# Patient Record
Sex: FEMALE | Race: BLACK OR AFRICAN AMERICAN | NOT HISPANIC OR LATINO | Employment: PART TIME | URBAN - METROPOLITAN AREA
[De-identification: names, ages, dates, MRNs, and addresses within clinical notes are randomized per-mention and may not be internally consistent; named-entity substitution may affect disease eponyms.]

---

## 2017-02-24 ENCOUNTER — OFFICE VISIT (OUTPATIENT)
Dept: CARDIOLOGY | Facility: CLINIC | Age: 47
End: 2017-02-24

## 2017-02-24 VITALS
HEIGHT: 68 IN | WEIGHT: 254 LBS | DIASTOLIC BLOOD PRESSURE: 80 MMHG | BODY MASS INDEX: 38.49 KG/M2 | SYSTOLIC BLOOD PRESSURE: 116 MMHG | OXYGEN SATURATION: 100 % | HEART RATE: 56 BPM

## 2017-02-24 DIAGNOSIS — I25.42 CORONARY ARTERY DISSECTION: Primary | ICD-10-CM

## 2017-02-24 DIAGNOSIS — R63.5 WEIGHT GAIN: ICD-10-CM

## 2017-02-24 DIAGNOSIS — I10 ESSENTIAL HYPERTENSION: ICD-10-CM

## 2017-02-24 PROBLEM — G56.00 CARPAL TUNNEL SYNDROME: Status: ACTIVE | Noted: 2017-02-24

## 2017-02-24 PROCEDURE — 93000 ELECTROCARDIOGRAM COMPLETE: CPT | Performed by: PHYSICIAN ASSISTANT

## 2017-02-24 PROCEDURE — 99213 OFFICE O/P EST LOW 20 MIN: CPT | Performed by: PHYSICIAN ASSISTANT

## 2017-02-24 NOTE — PROGRESS NOTES
Date of Office Visit: 2017  Encounter Provider: TJ Mreaz  Place of Service: [facility] CARDIOLOGY  Patient Name: Myesha Matthew  :1970    Chief Complaint   Patient presents with   • Leg Swelling     4 month follow up   :     HPI: Myesha Matthew is a 46 y.o. female , new to me, who presents today for follow-up.  Old records a been obtained and reviewed by me.  She is a patient of Dr. Cohn's who I first saw in 2016.  At the end of 2016 she had a non-STEMI.  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  The rest of her coronary arteries were normal.  When I saw her on 2016, she was doing well.  She had quit smoking cigarettes and her blood pressure was well controlled.  After her spontaneous coronary dissection, she was placed on aspirin, Plavix, Lipitor, and Lopressor.  She was doing well on all of these medications without complaints.  She then followed up with Dr. Cohn on 10/11/2016 and was still doing well at that visit.  She did complain of a little shortness of breath when she saw Dr. Cohn on 10/11/2016.  He felt that this was probably noncardiac and secondary to either pulmonary issues and/or her weight.  He encouraged her to try to lose some weight and continue with cardiac rehabilitation.   She is here today because she has gained a significant amount of weight over the past 6 months.  She is also noticed some swelling in her legs.  She is a little concerned about it and thought it might be her heart.  She denies any chest pain.  She actually thinks that her shortness of breath that she had in 2016 is improved.  She works 2 jobs, so she really doesn't have much of a chance to exercise.  She does not eat a very healthy diet either.  She says that she is working on trying to find a new career path so that she can take more time to care for herself.  She denies any  palpitations, dizziness, chest pain, or syncope.      Past Medical History   Diagnosis Date   • CAD (coronary artery disease)    • Carpal tunnel syndrome    • GERD (gastroesophageal reflux disease)    • NSTEMI (non-ST elevated myocardial infarction)        Past Surgical History   Procedure Laterality Date   • Cardiac catheterization Left 9/1/2016     Procedure: Cardiac catheterization;  Surgeon: Nicholas Cohn MD;  Location: [facility] INVASIVE LOCATION;  Service:        Social History     Social History   • Marital status: Single     Spouse name: N/A   • Number of children: N/A   • Years of education: N/A     Occupational History   • Not on file.     Social History Main Topics   • Smoking status: Former Smoker   • Smokeless tobacco: Not on file   • Alcohol use 1.2 oz/week     2 Shots of liquor per week      Comment: weekends   • Drug use: No   • Sexual activity: Defer     Other Topics Concern   • Not on file     Social History Narrative       Family History   Problem Relation Age of Onset   • Diabetes Mother    • Hypertension Mother    • Heart failure Mother    • Breast cancer Mother    • Hepatitis Father    • No Known Problems Sister    • Crohn's disease Brother    • Hypertension Maternal Grandmother    • COPD Maternal Grandmother    • No Known Problems Maternal Grandfather    • Heart disease Paternal Grandmother    • Heart attack Paternal Grandmother    • COPD Paternal Grandmother    • No Known Problems Paternal Grandfather        Review of Systems   Constitution: Positive for weight gain. Negative for chills, fever, malaise/fatigue and weight loss.   HENT: Negative for ear pain, headaches, hearing loss, nosebleeds and sore throat.    Eyes: Negative for double vision, pain and visual disturbance.   Cardiovascular: Positive for leg swelling. Negative for chest pain, dyspnea on exertion, irregular heartbeat, near-syncope, orthopnea, palpitations, paroxysmal nocturnal dyspnea and syncope.   Respiratory: Negative  "for cough, shortness of breath, sleep disturbances due to breathing, snoring and wheezing.    Endocrine: Negative for cold intolerance, heat intolerance and polyuria.   Skin: Negative for itching and rash.   Musculoskeletal: Negative for joint pain, joint swelling and myalgias.   Gastrointestinal: Negative for abdominal pain, diarrhea, melena, nausea and vomiting.   Genitourinary: Negative for frequency, hematuria and hesitancy.   Neurological: Negative for excessive daytime sleepiness, light-headedness, numbness, paresthesias and seizures.   Psychiatric/Behavioral: Negative for altered mental status and depression.   Allergic/Immunologic: Negative.    All other systems reviewed and are negative.      No Known Allergies      Current Outpatient Prescriptions:   •  aspirin 81 MG chewable tablet, Chew 1 tablet daily., Disp: , Rfl:   •  atorvastatin (LIPITOR) 80 MG tablet, Take 1 tablet by mouth every night., Disp: 30 tablet, Rfl: 5  •  clopidogrel (PLAVIX) 75 MG tablet, Take 1 tablet by mouth daily., Disp: 30 tablet, Rfl: 5  •  metoprolol tartrate (LOPRESSOR) 25 MG tablet, Take 0.5 tablets by mouth every 12 (twelve) hours., Disp: 30 tablet, Rfl: 5     Objective:     Vitals:    02/24/17 0943 02/24/17 0949   BP: 116/78 116/80   BP Location: Right arm Left arm   Pulse: 56    SpO2: 98% 100%   Weight: 254 lb (115 kg)    Height: 68\" (172.7 cm)      Body mass index is 38.62 kg/(m^2).    PHYSICAL EXAM:    Physical Exam   Constitutional: She is oriented to person, place, and time. She appears well-developed and well-nourished. No distress.   HENT:   Head: Normocephalic and atraumatic.   Eyes: Pupils are equal, round, and reactive to light.   Neck: No JVD present. No thyromegaly present.   Cardiovascular: Normal rate, regular rhythm, normal heart sounds and intact distal pulses.    No murmur heard.  Pulmonary/Chest: Effort normal and breath sounds normal. No respiratory distress.   Abdominal: Soft. Bowel sounds are normal. She " exhibits no distension. There is no splenomegaly or hepatomegaly. There is no tenderness.   Musculoskeletal: Normal range of motion. She exhibits edema.   Very mild, less than 1+ bilateral lower extremity edema.   Neurological: She is alert and oriented to person, place, and time.   Skin: Skin is warm and dry. She is not diaphoretic. No erythema.   Psychiatric: She has a normal mood and affect. Her behavior is normal. Judgment normal.         ECG 12 Lead  Date/Time: 2/24/2017 10:03 AM  Performed by: ELLI PERALES.  Authorized by: ELLI PERALES   Comparison: compared with previous ECG from 10/11/2016  Similar to previous ECG  Rhythm: sinus rhythm  BPM: 56  T depression: III, aVF, V3, V4, V5 and V6  Clinical impression: abnormal ECG  Comments: Indication: History of coronary dissection, subsequent non-STEMI              Assessment:       Diagnosis Plan   1. Coronary artery dissection  ECG 12 Lead   2. Essential hypertension     3. Weight gain       Orders Placed This Encounter   Procedures   • ECG 12 Lead     This order was created via procedure documentation          Plan:       1.  Non-STEMI secondary to coronary artery dissection.  Overall she is doing great.  She quit smoking and is no longer using the nicotine patch.  Her blood pressure is well-controlled, and she is not having any more chest pain.  Continue current medical regimen.    2.  Hypertension.  Her blood pressure is great today, continue current plan.    3.  Weight gain and complaints of leg swelling.  I really do not think that this is cardiac in etiology.  Her shortness of breath is actually improved.  The swelling in her legs is extremely mild.  I'm not really sure how to explain the weight gain, however I've asked her to follow up with her primary care physician.  I did offer her an echocardiogram to assess her LV function, however I really don't think this is necessary and she would like to hold off on this.  We did talk about regular exercise  and a heart healthy diet, she indicated that she understood.  I do think that a really great way to lose weight quickly is to cut out carbohydrates.  I did instruct her on how to do this.    She will follow-up with Dr. Cohn in 6 months or sooner if needed.    As always, it has been a pleasure to participate in your patient's care.      Sincerely,         Maryann Giraldo PA-C

## 2017-03-06 RX ORDER — ATORVASTATIN CALCIUM 80 MG/1
TABLET, FILM COATED ORAL
Qty: 30 TABLET | Refills: 0 | Status: SHIPPED | OUTPATIENT
Start: 2017-03-06 | End: 2017-04-02 | Stop reason: SDUPTHER

## 2017-03-06 RX ORDER — CLOPIDOGREL BISULFATE 75 MG/1
TABLET ORAL
Qty: 30 TABLET | Refills: 0 | Status: SHIPPED | OUTPATIENT
Start: 2017-03-06 | End: 2017-04-02 | Stop reason: SDUPTHER

## 2017-04-03 RX ORDER — ATORVASTATIN CALCIUM 80 MG/1
TABLET, FILM COATED ORAL
Qty: 30 TABLET | Refills: 6 | Status: SHIPPED | OUTPATIENT
Start: 2017-04-03 | End: 2018-04-12 | Stop reason: SDUPTHER

## 2017-04-03 RX ORDER — CLOPIDOGREL BISULFATE 75 MG/1
TABLET ORAL
Qty: 30 TABLET | Refills: 6 | Status: SHIPPED | OUTPATIENT
Start: 2017-04-03 | End: 2017-12-03 | Stop reason: SDUPTHER

## 2017-04-26 ENCOUNTER — HOSPITAL ENCOUNTER (EMERGENCY)
Dept: HOSPITAL 23 - SED | Age: 47
Discharge: HOME | End: 2017-04-26
Payer: COMMERCIAL

## 2017-04-26 DIAGNOSIS — F17.200: ICD-10-CM

## 2017-04-26 DIAGNOSIS — Z90.710: ICD-10-CM

## 2017-04-26 DIAGNOSIS — R10.13: Primary | ICD-10-CM

## 2017-04-26 LAB
BARBITURATES UR QL SCN: 0.4 MG/DL (ref 0.2–2)
BARBITURATES UR QL SCN: 3.8 G/DL (ref 3.5–5)
BASOPHIL#: 0.1 X10E3 (ref 0–0.3)
BASOPHIL%: 0.8 % (ref 0–2.5)
BENZODIAZ UR QL SCN: 21 U/L (ref 10–42)
BENZODIAZ UR QL SCN: 23 U/L (ref 10–40)
BLOOD UREA NITROGEN: 17 MG/DL (ref 9–23)
BUN/CREATININE RATIO: 24.28
BZE UR QL SCN: 47 U/L (ref 32–92)
CALCIUM SERUM: 8.9 MG/DL (ref 8.4–10.2)
CK MB SERPL-RTO: 13.2 % (ref 11–15.5)
CK MB SERPL-RTO: 33.3 G/DL (ref 30–36)
CREATININE SERUM: 0.7 MG/DL (ref 0.6–1.4)
DIFF IND: NO
EOSINOPHIL#: 0.1 X10E3 (ref 0–0.7)
EOSINOPHIL%: 0.8 % (ref 0–7)
GLOM FILT RATE ESTIMATED: 120.4 ML/MIN (ref 60–?)
GLUCOSE FASTING: 100 MG/DL (ref 70–110)
HEMATOCRIT: 38.1 % (ref 35–45)
HEMOGLOBIN: 12.7 GM/DL (ref 12–16)
HIV1+2 AB SPEC QL IA.RAPID: 26.2 SECONDS (ref 25.6–38.1)
INR: 1
KETONES UR QL: 103 MMOL/L (ref 100–111)
KETONES UR QL: 25 MMOL/L (ref 22–31)
LIPASE: 24 U/L (ref 22–51)
LYMPHOCYTE#: 2 X10E3 (ref 1–3.5)
LYMPHOCYTE%: 16.1 % (ref 17–45)
MEAN CELL VOLUME: 90.6 FL (ref 83–96)
MEAN CORPUSCULAR HEMOGLOBIN: 30.2 PG (ref 28–34)
MEAN PLATELET VOLUME: 7.4 FL (ref 6.5–11.5)
METHADONE UR QL SCN: <1 NG/ML (ref 0–7.9)
MONOCYTE#: 0.8 X10E3 (ref 0–1)
MONOCYTE%: 6.8 % (ref 3–12)
NEUTROPHIL#: 9.5 X10E3 (ref 1.5–7.1)
NEUTROPHIL%: 75.5 % (ref 40–75)
PLATELET COUNT: 362 X10E3 (ref 140–420)
POC - TROPONIN: <0.05 NG/ML (ref ?–0.05)
POTASSIUM: 3.9 MMOL/L (ref 3.5–5.1)
PROTEIN TOTAL SERUM: 7.2 G/DL (ref 6–8.3)
PROTHROMBIN TIME (PATIENT): 11.6 SECONDS (ref 9.5–12.4)
RED BLOOD COUNT: 4.21 X10E (ref 3.9–5.3)
SODIUM: 133 MMOL/L (ref 135–145)
WHITE BLOOD COUNT: 12.5 X10E3 (ref 4–10.5)

## 2017-09-08 ENCOUNTER — OFFICE VISIT (OUTPATIENT)
Dept: CARDIOLOGY | Facility: CLINIC | Age: 47
End: 2017-09-08

## 2017-09-08 VITALS
WEIGHT: 254 LBS | HEART RATE: 76 BPM | BODY MASS INDEX: 38.49 KG/M2 | HEIGHT: 68 IN | SYSTOLIC BLOOD PRESSURE: 132 MMHG | DIASTOLIC BLOOD PRESSURE: 74 MMHG

## 2017-09-08 DIAGNOSIS — I21.4 NON-STEMI (NON-ST ELEVATED MYOCARDIAL INFARCTION) (HCC): Primary | ICD-10-CM

## 2017-09-08 DIAGNOSIS — E66.09 NON MORBID OBESITY DUE TO EXCESS CALORIES: ICD-10-CM

## 2017-09-08 DIAGNOSIS — I25.42 CORONARY ARTERY DISSECTION: ICD-10-CM

## 2017-09-08 PROCEDURE — 93000 ELECTROCARDIOGRAM COMPLETE: CPT | Performed by: INTERNAL MEDICINE

## 2017-09-08 PROCEDURE — 99214 OFFICE O/P EST MOD 30 MIN: CPT | Performed by: INTERNAL MEDICINE

## 2017-09-08 NOTE — PROGRESS NOTES
Date of Office Visit: 2017  Encounter Provider: Nicholas Cohn MD  Place of Service: [facility] CARDIOLOGY  Patient Name: Myesha Matthew  :1970  [id number]    Chief Complaint   Patient presents with   • Coronary Artery Disease     6 MONTH F/U   • NSTEMI   :     HPI: Myesha Matthew is a 46 y.o. female  she is a lady who had this I think a spontaneous coronary artery dissection to her distal LAD about a year ago since that time she has stop smoking or other coronaries were normal her LV function was normal overall she's felt well she's not had chest pain shortness of breath PND orthopnea and edema she's under number of stressors work-wise trying to make her bills try to make ends meet it's a little bit teary-eyed in the office    Past Medical History:   Diagnosis Date   • CAD (coronary artery disease)    • Carpal tunnel syndrome    • GERD (gastroesophageal reflux disease)    • NSTEMI (non-ST elevated myocardial infarction)        Past Surgical History:   Procedure Laterality Date   • CARDIAC CATHETERIZATION Left 2016    Procedure: Cardiac catheterization;  Surgeon: Nicholas Cohn MD;  Location: [facility] INVASIVE LOCATION;  Service:        Social History     Social History   • Marital status: Single     Spouse name: N/A   • Number of children: N/A   • Years of education: N/A     Occupational History   • Not on file.     Social History Main Topics   • Smoking status: Former Smoker   • Smokeless tobacco: Not on file   • Alcohol use 1.2 oz/week     2 Shots of liquor per week      Comment: weekends   • Drug use: No   • Sexual activity: Defer     Other Topics Concern   • Not on file     Social History Narrative       Family History   Problem Relation Age of Onset   • Diabetes Mother    • Hypertension Mother    • Heart failure Mother    • Breast cancer Mother    • Hepatitis Father    • No Known Problems Sister    • Crohn's disease Brother    • Hypertension Maternal Grandmother  "   • COPD Maternal Grandmother    • No Known Problems Maternal Grandfather    • Heart disease Paternal Grandmother    • Heart attack Paternal Grandmother    • COPD Paternal Grandmother    • No Known Problems Paternal Grandfather        Review of Systems   Constitution: Negative for decreased appetite, fever, malaise/fatigue and weight loss.   HENT: Negative for nosebleeds.    Eyes: Negative for double vision.   Cardiovascular: Negative for chest pain, claudication, cyanosis, dyspnea on exertion, irregular heartbeat, leg swelling, near-syncope, orthopnea, palpitations, paroxysmal nocturnal dyspnea and syncope.   Respiratory: Negative for cough, hemoptysis and shortness of breath.    Hematologic/Lymphatic: Negative for bleeding problem.   Skin: Negative for rash.   Musculoskeletal: Negative for falls and myalgias.   Gastrointestinal: Negative for hematochezia, jaundice, melena, nausea and vomiting.   Genitourinary: Negative for hematuria.   Neurological: Negative for dizziness and seizures.   Psychiatric/Behavioral: Negative for altered mental status and memory loss.       No Known Allergies      Current Outpatient Prescriptions:   •  aspirin 81 MG chewable tablet, Chew 1 tablet daily., Disp: , Rfl:   •  atorvastatin (LIPITOR) 80 MG tablet, TAKE 1 TABLET BY MOUTH EVERY NIGHT, Disp: 30 tablet, Rfl: 6  •  clopidogrel (PLAVIX) 75 MG tablet, TAKE 1 TABLET BY MOUTH DAILY, Disp: 30 tablet, Rfl: 6  •  metoprolol tartrate (LOPRESSOR) 25 MG tablet, TAKE 1/2 TABLET BY MOUTH EVERY 12 HOURS, Disp: 30 tablet, Rfl: 0     Objective:     Vitals:    09/08/17 1003   BP: 132/74   Pulse: 76   Weight: 254 lb (115 kg)   Height: 68\" (172.7 cm)     Body mass index is 38.62 kg/(m^2).    Physical Exam   Constitutional: She is oriented to person, place, and time. She appears well-developed and well-nourished.   HENT:   Head: Normocephalic.   Eyes: No scleral icterus.   Neck: No JVD present. No thyromegaly present.   Cardiovascular: Normal rate, " regular rhythm and normal heart sounds.  Exam reveals no gallop and no friction rub.    No murmur heard.  Pulmonary/Chest: Effort normal and breath sounds normal. She has no wheezes. She has no rales.   Abdominal: Soft. There is no hepatosplenomegaly. There is no tenderness.   Musculoskeletal: Normal range of motion. She exhibits no edema.   Lymphadenopathy:     She has no cervical adenopathy.   Neurological: She is alert and oriented to person, place, and time.   Skin: Skin is warm and dry. No rash noted.   Psychiatric: She has a normal mood and affect.         ECG 12 Lead  Date/Time: 9/8/2017 11:12 AM  Performed by: ADAMA COHN  Authorized by: ADAMA COHN   Comparison: compared with previous ECG   Similar to previous ECG  Rhythm: sinus rhythm  Clinical impression: abnormal ECG  Comments: ns ST-T wave abnormality             Assessment:       Diagnosis Plan   1. Non-STEMI (non-ST elevated myocardial infarction)     2. Coronary artery dissection     3. Non morbid obesity due to excess calories            Plan:       I think that she is doing well.  I have encouraged her to try and lose a little bit overweight proud of her efforts that she is made so far sort her risk modification and going to have her come back and see me in a year I would like to stay on the same medicines I set a goal of 225 for her weight    Coronary Artery Disease  Assessment  • The patient has no angina    Plan  • Lifestyle modifications discussed include adhering to a heart healthy diet, maintenance of a healthy weight, medication compliance, regular exercise and regular monitoring of cholesterol and blood pressure    Subjective - Objective  • There is a history of past MI  • Current antiplatelet therapy includes aspirin 81 mg and clopidogrel 75 mg          As always, it has been a pleasure to participate in your patient's care.      Sincerely,       Adama Cohn MD

## 2017-12-04 RX ORDER — CLOPIDOGREL BISULFATE 75 MG/1
TABLET ORAL
Qty: 90 TABLET | Refills: 0 | Status: SHIPPED | OUTPATIENT
Start: 2017-12-04 | End: 2018-03-16 | Stop reason: SDUPTHER

## 2018-03-16 RX ORDER — CLOPIDOGREL BISULFATE 75 MG/1
TABLET ORAL
Qty: 90 TABLET | Refills: 1 | Status: SHIPPED | OUTPATIENT
Start: 2018-03-16 | End: 2020-02-20 | Stop reason: SDUPTHER

## 2018-04-12 ENCOUNTER — TELEPHONE (OUTPATIENT)
Dept: CARDIOLOGY | Facility: CLINIC | Age: 48
End: 2018-04-12

## 2018-04-12 RX ORDER — ATORVASTATIN CALCIUM 80 MG/1
TABLET, FILM COATED ORAL
Qty: 30 TABLET | Refills: 0 | Status: SHIPPED | OUTPATIENT
Start: 2018-04-12 | End: 2018-07-26 | Stop reason: SDUPTHER

## 2018-07-26 ENCOUNTER — TELEPHONE (OUTPATIENT)
Dept: CARDIOLOGY | Facility: CLINIC | Age: 48
End: 2018-07-26

## 2018-07-26 RX ORDER — ATORVASTATIN CALCIUM 80 MG/1
TABLET, FILM COATED ORAL
Qty: 30 TABLET | Refills: 0 | Status: SHIPPED | OUTPATIENT
Start: 2018-07-26 | End: 2018-09-27 | Stop reason: SDUPTHER

## 2018-07-26 NOTE — TELEPHONE ENCOUNTER
Called patient she will go for her lipid panel in the next couple of weeks.  Faxed order to the hosp.

## 2018-08-16 RX ORDER — ATORVASTATIN CALCIUM 80 MG/1
TABLET, FILM COATED ORAL
Qty: 30 TABLET | Refills: 0 | OUTPATIENT
Start: 2018-08-16

## 2018-09-27 RX ORDER — ATORVASTATIN CALCIUM 80 MG/1
80 TABLET, FILM COATED ORAL NIGHTLY
Qty: 90 TABLET | Refills: 2 | Status: SHIPPED | OUTPATIENT
Start: 2018-09-27 | End: 2022-11-02

## 2018-10-08 ENCOUNTER — OFFICE VISIT (OUTPATIENT)
Dept: CARDIOLOGY | Facility: CLINIC | Age: 48
End: 2018-10-08

## 2018-10-08 VITALS
WEIGHT: 254 LBS | DIASTOLIC BLOOD PRESSURE: 82 MMHG | HEIGHT: 68 IN | HEART RATE: 74 BPM | BODY MASS INDEX: 38.49 KG/M2 | OXYGEN SATURATION: 98 % | SYSTOLIC BLOOD PRESSURE: 120 MMHG

## 2018-10-08 DIAGNOSIS — I25.42 CORONARY ARTERY DISSECTION: Primary | ICD-10-CM

## 2018-10-08 PROCEDURE — 93000 ELECTROCARDIOGRAM COMPLETE: CPT | Performed by: PHYSICIAN ASSISTANT

## 2018-10-08 PROCEDURE — 99213 OFFICE O/P EST LOW 20 MIN: CPT | Performed by: PHYSICIAN ASSISTANT

## 2018-10-08 NOTE — PROGRESS NOTES
Date of Office Visit: 10/08/2018  Encounter Provider: TJ Way  Place of Service: [facility] CARDIOLOGY  Patient Name: Myesha Matthew  :1970    Chief Complaint   Patient presents with   • Coronary Artery Disease     1 year follow up   :     HPI: Myesha Matthew is a 47 y.o. female who presents today for follow-up.  Old records have been obtained and reviewed by me.  She is a patient of Dr. Cohn's with a past cardiac history significant for a spontaneous coronary artery dissection in her distal LAD in 2016.  At that time, the rest of her coronary arteries were normal.  After her dissection, she was started on aspirin, Plavix, Lipitor, and Lopressor.  She did well with these medications.  She saw me in 2017 with complaints of weight gain.  I did not think it was her heart, and I felt it was more related to her diet and exercise regimen or lack thereof.  She was last in our office to see Dr. Cohn on 2017.  At that visit she was doing well without complaints of angina or heart failure.  She was under a lot of stress at work and financially.  Dr. Cohn again talked about diet and exercise with her.  She is here today for yearly visit.   Since she was last in our office she's been doing well.  Fortunately the job and financial stressors in her life have gone away.  She is still struggling with diet and exercise.  She has not had any palpitations, shortness of breath, edema, dizziness, syncope, or chest pain.  She is occasionally fatigued.  She states that her primary care doctor did give her a diuretic to be taken as needed for leg swelling, this has helped her.      Past Medical History:   Diagnosis Date   • CAD (coronary artery disease)    • Carpal tunnel syndrome    • GERD (gastroesophageal reflux disease)    • NSTEMI (non-ST elevated myocardial infarction) (CMS/[facility])        Past Surgical History:   Procedure Laterality Date   • CARDIAC  CATHETERIZATION Left 9/1/2016    Procedure: Cardiac catheterization;  Surgeon: Nicholas Cohn MD;  Location: [facility] INVASIVE LOCATION;  Service:        Social History     Social History   • Marital status: Single     Spouse name: N/A   • Number of children: N/A   • Years of education: N/A     Occupational History   • Not on file.     Social History Main Topics   • Smoking status: Former Smoker   • Smokeless tobacco: Not on file   • Alcohol use 1.2 oz/week     2 Shots of liquor per week      Comment: weekends   • Drug use: No   • Sexual activity: Defer     Other Topics Concern   • Not on file     Social History Narrative   • No narrative on file       Family History   Problem Relation Age of Onset   • Diabetes Mother    • Hypertension Mother    • Heart failure Mother    • Breast cancer Mother    • Hepatitis Father    • No Known Problems Sister    • Crohn's disease Brother    • Hypertension Maternal Grandmother    • COPD Maternal Grandmother    • No Known Problems Maternal Grandfather    • Heart disease Paternal Grandmother    • Heart attack Paternal Grandmother    • COPD Paternal Grandmother    • No Known Problems Paternal Grandfather        Review of Systems   Constitution: Positive for malaise/fatigue. Negative for chills and fever.   Cardiovascular: Negative for chest pain, dyspnea on exertion, leg swelling, near-syncope, orthopnea, palpitations, paroxysmal nocturnal dyspnea and syncope.   Respiratory: Negative for cough and shortness of breath.    Musculoskeletal: Negative for joint pain, joint swelling and myalgias.   Gastrointestinal: Negative for abdominal pain, diarrhea, melena, nausea and vomiting.   Genitourinary: Negative for frequency and hematuria.   Neurological: Negative for light-headedness, numbness, paresthesias and seizures.   Allergic/Immunologic: Negative.    All other systems reviewed and are negative.      No Known Allergies      Current Outpatient Prescriptions:   •  aspirin 81 MG  "chewable tablet, Chew 1 tablet daily., Disp: , Rfl:   •  atorvastatin (LIPITOR) 80 MG tablet, Take 1 tablet by mouth Every Night., Disp: 90 tablet, Rfl: 2  •  clopidogrel (PLAVIX) 75 MG tablet, TAKE 1 TABLET BY MOUTH DAILY, Disp: 90 tablet, Rfl: 1  •  metoprolol tartrate (LOPRESSOR) 25 MG tablet, TAKE 1/2 TABLET BY MOUTH EVERY 12 HOURS, Disp: 30 tablet, Rfl: 3      Objective:     Vitals:    10/08/18 1253 10/08/18 1256   BP: 112/78 120/82   BP Location: Right arm Left arm   Pulse: 74    SpO2: 98%    Weight: 115 kg (254 lb)    Height: 172.7 cm (68\")      Body mass index is 38.62 kg/m².    PHYSICAL EXAM:    Physical Exam   Constitutional: She is oriented to person, place, and time. She appears well-developed and well-nourished. No distress.   HENT:   Head: Normocephalic and atraumatic.   Eyes: Pupils are equal, round, and reactive to light.   Neck: No JVD present. No thyromegaly present.   Cardiovascular: Normal rate, regular rhythm, normal heart sounds and intact distal pulses.    No murmur heard.  Pulmonary/Chest: Effort normal and breath sounds normal. No respiratory distress.   Abdominal: Soft. Bowel sounds are normal. She exhibits no distension. There is no splenomegaly or hepatomegaly. There is no tenderness.   Musculoskeletal: Normal range of motion. She exhibits no edema.   Neurological: She is alert and oriented to person, place, and time.   Skin: Skin is warm and dry. She is not diaphoretic. No erythema.   Psychiatric: She has a normal mood and affect. Her behavior is normal. Judgment normal.         ECG 12 Lead  Date/Time: 10/8/2018 1:12 PM  Performed by: ELLI OCAMPO  Authorized by: ELLI OCAMPO   Comparison: compared with previous ECG from 9/8/2017  Similar to previous ECG  Rhythm: sinus rhythm  BPM: 63  T flattening: V3, V4, V5 and V6  Clinical impression: abnormal ECG  Comments: Indication: Coronary dissection.              Assessment:       Diagnosis Plan   1. Coronary artery dissection  ECG 12 " Lead     Orders Placed This Encounter   Procedures   • ECG 12 Lead     This order was created via procedure documentation          Plan:       Overall she stable and doing well.  She had a spontaneous LAD dissection in August 2016.  She has done well on her current medical regimen.  She is at risk of having another spontaneous dissection, and I did discuss the plan of care with Dr. Cohn.  I'm going to keep her on her current medical regimen.  Again, we did have a discussion about exercise and diet as a way to lose weight.  Hopefully now that the stressors in her life have calmed down, she can actually implement this plan.    I'm not going to make any changes to her medical regimen, and she will follow-up with Dr. Cohn in 1 year or sooner if needed.    As always, it has been a pleasure to participate in your patient's care.      Sincerely,         Maryann Enriquez PA-C

## 2019-10-11 ENCOUNTER — OFFICE VISIT (OUTPATIENT)
Dept: CARDIOLOGY | Facility: CLINIC | Age: 49
End: 2019-10-11

## 2019-10-11 VITALS
HEIGHT: 68 IN | WEIGHT: 272 LBS | HEART RATE: 71 BPM | BODY MASS INDEX: 41.22 KG/M2 | DIASTOLIC BLOOD PRESSURE: 86 MMHG | SYSTOLIC BLOOD PRESSURE: 122 MMHG

## 2019-10-11 DIAGNOSIS — I25.42 CORONARY ARTERY DISSECTION: ICD-10-CM

## 2019-10-11 DIAGNOSIS — I21.4 NON-STEMI (NON-ST ELEVATED MYOCARDIAL INFARCTION) (HCC): Primary | ICD-10-CM

## 2019-10-11 DIAGNOSIS — E66.01 CLASS 3 SEVERE OBESITY DUE TO EXCESS CALORIES WITH SERIOUS COMORBIDITY AND BODY MASS INDEX (BMI) OF 40.0 TO 44.9 IN ADULT (HCC): ICD-10-CM

## 2019-10-11 PROCEDURE — 93000 ELECTROCARDIOGRAM COMPLETE: CPT | Performed by: INTERNAL MEDICINE

## 2019-10-11 PROCEDURE — 99214 OFFICE O/P EST MOD 30 MIN: CPT | Performed by: INTERNAL MEDICINE

## 2019-10-11 RX ORDER — HYDROCHLOROTHIAZIDE 12.5 MG/1
1 CAPSULE, GELATIN COATED ORAL DAILY
Refills: 0 | COMMUNITY
Start: 2019-07-22

## 2019-10-11 NOTE — PROGRESS NOTES
Date of Office Visit: 10/11/2019  Encounter Provider: Nicholas Cohn MD  Place of Service: [facility] CARDIOLOGY  Patient Name: Myesha Matthew  :1970  [id number]    Chief Complaint   Patient presents with   • Follow-up     1 year   • Coronary Artery Disease   :     HPI: Myesha Matthew is a 48 y.o. 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  She has not had any cardiac symptoms since then no PND orthopnea edema syncope palpitations    Past Medical History:   Diagnosis Date   • CAD (coronary artery disease)    • Carpal tunnel syndrome    • GERD (gastroesophageal reflux disease)    • NSTEMI (non-ST elevated myocardial infarction) (CMS/[facility])        Past Surgical History:   Procedure Laterality Date   • CARDIAC CATHETERIZATION Left 2016    Procedure: Cardiac catheterization;  Surgeon: Nicholas Cohn MD;  Location: [facility] INVASIVE LOCATION;  Service:        Social History     Socioeconomic History   • Marital status: Single     Spouse name: Not on file   • Number of children: Not on file   • Years of education: Not on file   • Highest education level: Not on file   Tobacco Use   • Smoking status: Former Smoker   • Smokeless tobacco: Never Used   Substance and Sexual Activity   • Alcohol use: Yes     Alcohol/week: 1.2 oz     Types: 2 Shots of liquor per week     Comment: weekends   • Drug use: No   • Sexual activity: Defer       Family History   Problem Relation Age of Onset   • Diabetes Mother    • Hypertension Mother    • Heart failure Mother    • Breast cancer Mother    • Hepatitis Father    • No Known Problems Sister    • Crohn's disease Brother    • Hypertension Maternal Grandmother    • COPD Maternal Grandmother    • No  "Known Problems Maternal Grandfather    • Heart disease Paternal Grandmother    • Heart attack Paternal Grandmother    • COPD Paternal Grandmother    • No Known Problems Paternal Grandfather        Review of Systems   Constitution: Negative for decreased appetite, fever, malaise/fatigue and weight loss.   HENT: Negative for nosebleeds.    Eyes: Negative for double vision.   Cardiovascular: Negative for chest pain, claudication, cyanosis, dyspnea on exertion, irregular heartbeat, leg swelling, near-syncope, orthopnea, palpitations, paroxysmal nocturnal dyspnea and syncope.   Respiratory: Negative for cough, hemoptysis and shortness of breath.    Hematologic/Lymphatic: Negative for bleeding problem.   Skin: Negative for rash.   Musculoskeletal: Negative for falls and myalgias.   Gastrointestinal: Negative for hematochezia, jaundice, melena, nausea and vomiting.   Genitourinary: Negative for hematuria.   Neurological: Negative for dizziness and seizures.   Psychiatric/Behavioral: Negative for altered mental status and memory loss.       No Known Allergies      Current Outpatient Medications:   •  aspirin 81 MG chewable tablet, Chew 1 tablet daily., Disp: , Rfl:   •  atorvastatin (LIPITOR) 80 MG tablet, Take 1 tablet by mouth Every Night., Disp: 90 tablet, Rfl: 2  •  clopidogrel (PLAVIX) 75 MG tablet, TAKE 1 TABLET BY MOUTH DAILY, Disp: 90 tablet, Rfl: 1  •  hydroCHLOROthiazide (MICROZIDE) 12.5 MG capsule, Take 1 capsule by mouth As Needed., Disp: , Rfl: 0  •  metoprolol tartrate (LOPRESSOR) 25 MG tablet, TAKE 1/2 TABLET BY MOUTH EVERY 12 HOURS, Disp: 30 tablet, Rfl: 3      Objective:     Vitals:    10/11/19 1117   BP: 122/86   Pulse: 71   Weight: 123 kg (272 lb)   Height: 172.7 cm (68\")     Body mass index is 41.36 kg/m².    Physical Exam   Constitutional: She is oriented to person, place, and time. She appears well-developed and well-nourished.   Obese   HENT:   Head: Normocephalic.   Eyes: No scleral icterus.   Neck: " No JVD present. No thyromegaly present.   Cardiovascular: Normal rate, regular rhythm and normal heart sounds. Exam reveals no gallop and no friction rub.   No murmur heard.  Pulmonary/Chest: Effort normal and breath sounds normal. She has no wheezes. She has no rales.   Abdominal: Soft. There is no hepatosplenomegaly. There is no tenderness.   Musculoskeletal: Normal range of motion. She exhibits no edema.   Lymphadenopathy:     She has no cervical adenopathy.   Neurological: She is alert and oriented to person, place, and time.   Skin: Skin is warm and dry. No rash noted.   Psychiatric: She has a normal mood and affect.         ECG 12 Lead  Date/Time: 10/11/2019 12:25 PM  Performed by: Nicholas Cohn MD  Authorized by: Nicholas Cohn MD   Comparison: compared with previous ECG   Similar to previous ECG  Rhythm: sinus rhythm  Other findings: non-specific ST-T wave changes    Clinical impression: abnormal EKG             Assessment:       Diagnosis Plan   1. Non-STEMI (non-ST elevated myocardial infarction) (CMS/[facility])     2. Coronary artery dissection     3. Class 3 severe obesity due to excess calories with serious comorbidity and body mass index (BMI) of 40.0 to 44.9 in adult (CMS/[facility])            Plan:       She is here and doing pretty well she had spontaneous coronary artery dissection of the mid to distal LAD this is probably not true atherosclerosis although she did stop smoking which is great but probably represents medial necrosis of the small vessels in the coronaries that then dissect I am she is at risk to have it happen again but I have actually never had anyone have a second 1 but I read reports about it I think we are going to continue her on long-term clopidogrel but I am going to stop her aspirin I did spend a lot of time counseling her on weight loss just in general I think she will be better with that I am going to have her come back and see Selina in a year and see me in 2    As always, it has  been a pleasure to participate in your patient's care.      Sincerely,       Nicholas Cohn MD

## 2020-01-03 ENCOUNTER — TELEPHONE (OUTPATIENT)
Dept: CARDIOLOGY | Facility: CLINIC | Age: 50
End: 2020-01-03

## 2020-01-03 NOTE — TELEPHONE ENCOUNTER
Pt left msg saying there's a medicine she should've taken and hasn't all week and asked for a call back.  I called and reached her VM, left a msg for her to call back-additional information is needed re the msg she left.    Yaz

## 2020-01-03 NOTE — TELEPHONE ENCOUNTER
Pt left msg returning my call and asked I call back.  I called her back at the number she left and got VM again.  I asked her to call me back./prt

## 2020-02-20 RX ORDER — CLOPIDOGREL BISULFATE 75 MG/1
75 TABLET ORAL DAILY
Qty: 90 TABLET | Refills: 3 | Status: SHIPPED | OUTPATIENT
Start: 2020-02-20

## 2020-10-13 ENCOUNTER — OFFICE VISIT (OUTPATIENT)
Dept: CARDIOLOGY | Facility: CLINIC | Age: 50
End: 2020-10-13

## 2020-10-13 VITALS
HEART RATE: 77 BPM | RESPIRATION RATE: 18 BRPM | HEIGHT: 68 IN | DIASTOLIC BLOOD PRESSURE: 86 MMHG | SYSTOLIC BLOOD PRESSURE: 138 MMHG | BODY MASS INDEX: 44.41 KG/M2 | WEIGHT: 293 LBS | OXYGEN SATURATION: 98 %

## 2020-10-13 DIAGNOSIS — I25.42 CORONARY ARTERY DISSECTION: Primary | ICD-10-CM

## 2020-10-13 PROCEDURE — 93000 ELECTROCARDIOGRAM COMPLETE: CPT | Performed by: NURSE PRACTITIONER

## 2020-10-13 PROCEDURE — 99213 OFFICE O/P EST LOW 20 MIN: CPT | Performed by: NURSE PRACTITIONER

## 2020-10-13 RX ORDER — CYCLOBENZAPRINE HCL 10 MG
10 TABLET ORAL AS NEEDED
COMMUNITY
Start: 2020-08-24

## 2020-10-13 NOTE — PROGRESS NOTES
Date of Office Visit: 10/13/2020  Encounter Provider: NAS Valdivia  Place of Service: [facility] CARDIOLOGY  Patient Name: Myesha Matthew  :1970    Chief Complaint   Patient presents with   • Coronary Artery Disease     1 YR FOLLOW UP   :     HPI: Myesha Matthew is a 49 y.o. female, new to me, who presents today for follow-up.  Old records have been obtained and reviewed by me.  She is a patient of Dr. Cohn's with a past cardiac history significant for spontaneous coronary artery dissection of her distal LAD in .  At that time, the rest of her coronaries were normal.  She was treated with medical therapy and did well.  She was last seen in the office by Dr. Cohn on 10/11/2019 at which time she was doing well with no complaints of angina or heart failure.  Dr. Cohn noted that we are going to continue her on long-term Plavix.  She was advised to follow-up in 1 year.   She has been doing well.  She denies any chest pain, shortness of breath, palpitations, edema, dizziness, or syncope.  She denies any bleeding difficulties or melena.  She admittedly has not been exercising and has subsequently gained some weight.  She has plans to start walking again soon.    Past Medical History:   Diagnosis Date   • CAD (coronary artery disease)    • Carpal tunnel syndrome    • GERD (gastroesophageal reflux disease)    • NSTEMI (non-ST elevated myocardial infarction) (CMS/[facility])        Past Surgical History:   Procedure Laterality Date   • CARDIAC CATHETERIZATION Left 2016    Procedure: Cardiac catheterization;  Surgeon: Nicholas Cohn MD;  Location: [facility] INVASIVE LOCATION;  Service:        Social History     Socioeconomic History   • Marital status: Single     Spouse name: Not on file   • Number of children: Not on file   • Years of education: Not on file   • Highest education level: Not on file   Tobacco Use   • Smoking status: Former Smoker   • Smokeless tobacco:  Never Used   • Tobacco comment: CAFFEINE USE: 2-3 GINGER ALE DAILY   Substance and Sexual Activity   • Alcohol use: Yes     Alcohol/week: 2.0 standard drinks     Types: 2 Shots of liquor per week     Comment: weekends   • Drug use: No   • Sexual activity: Defer       Family History   Problem Relation Age of Onset   • Diabetes Mother    • Hypertension Mother    • Heart failure Mother    • Breast cancer Mother    • Hepatitis Father    • No Known Problems Sister    • Crohn's disease Brother    • Hypertension Maternal Grandmother    • COPD Maternal Grandmother    • No Known Problems Maternal Grandfather    • Heart disease Paternal Grandmother    • Heart attack Paternal Grandmother    • COPD Paternal Grandmother    • No Known Problems Paternal Grandfather        Review of Systems   Constitution: Negative for chills, fever and malaise/fatigue.   Cardiovascular: Negative for chest pain, dyspnea on exertion, leg swelling, near-syncope, orthopnea, palpitations, paroxysmal nocturnal dyspnea and syncope.   Respiratory: Negative for cough and shortness of breath.    Musculoskeletal: Negative for joint pain, joint swelling and myalgias.   Gastrointestinal: Negative for abdominal pain, diarrhea, melena, nausea and vomiting.   Genitourinary: Negative for frequency and hematuria.   Neurological: Negative for light-headedness, numbness, paresthesias and seizures.   Allergic/Immunologic: Negative.    All other systems reviewed and are negative.      No Known Allergies      Current Outpatient Medications:   •  atorvastatin (LIPITOR) 80 MG tablet, Take 1 tablet by mouth Every Night., Disp: 90 tablet, Rfl: 2  •  clopidogrel (PLAVIX) 75 MG tablet, Take 1 tablet by mouth Daily., Disp: 90 tablet, Rfl: 3  •  cyclobenzaprine (FLEXERIL) 10 MG tablet, Take 10 mg by mouth As Needed., Disp: , Rfl:   •  hydroCHLOROthiazide (MICROZIDE) 12.5 MG capsule, Take 1 capsule by mouth As Needed., Disp: , Rfl: 0  •  metoprolol tartrate (LOPRESSOR) 25 MG  "tablet, Take 0.5 tablets by mouth Every 12 (Twelve) Hours., Disp: 90 tablet, Rfl: 3      Objective:     Vitals:    10/13/20 1153 10/13/20 1159   BP: 134/82 138/86   BP Location: Right arm Left arm   Patient Position: Sitting Sitting   Pulse: 77    Resp: 18    SpO2: 98%    Weight: 134 kg (295 lb)    Height: 172.7 cm (68\")      Body mass index is 44.85 kg/m².    PHYSICAL EXAM:    Constitutional:       General: Not in acute distress.     Appearance: Well-developed. Not diaphoretic.   Eyes:      Pupils: Pupils are equal, round, and reactive to light.   HENT:      Head: Normocephalic and atraumatic.   Neck:      Thyroid: No thyromegaly.      Vascular: No JVD.   Pulmonary:      Effort: Pulmonary effort is normal. No respiratory distress.      Breath sounds: Normal breath sounds.   Cardiovascular:      Normal rate. Regular rhythm.   Pulses:     Intact distal pulses.   Abdominal:      General: Bowel sounds are normal. There is no distension.      Palpations: Abdomen is soft. There is no hepatomegaly or splenomegaly.      Tenderness: There is no abdominal tenderness.   Musculoskeletal: Normal range of motion.   Skin:     General: Skin is warm and dry.      Findings: No erythema.   Neurological:      Mental Status: Alert and oriented to person, place, and time.   Psychiatric:         Behavior: Behavior normal.         Judgment: Judgment normal.           ECG 12 Lead    Date/Time: 10/13/2020 12:05 PM  Performed by: Selina Mckenna APRN  Authorized by: Selina Mckenna APRN   Comparison: compared with previous ECG from 10/11/2019  Similar to previous ECG  Rhythm: sinus rhythm  Rate: normal  BPM: 68  T inversion: III  T flattening: aVF  Other findings: non-specific ST-T wave changes    Clinical impression: normal ECG  Comments: Indication: Spontaneous coronary artery dissection              Assessment:       Diagnosis Plan   1. Coronary artery dissection  ECG 12 Lead     Orders Placed This Encounter   Procedures   • ECG " 12 Lead     This order was created via procedure documentation          Plan:       Overall think she is stable and doing well.  She denies any symptoms of angina or heart failure.  I did recommend she start exercising and try to lose some weight.  We discussed the importance of good blood pressure control.  I will request the results of her most recent lipid panel from her PCP.  Otherwise, I am not going to make any changes.  She will follow-up with Dr. Cohn in 1 year or sooner if needed.      As always, it has been a pleasure to participate in your patient's care.      Sincerely,         NAS Gallo

## 2021-10-27 ENCOUNTER — LAB (OUTPATIENT)
Dept: LAB | Facility: HOSPITAL | Age: 51
End: 2021-10-27

## 2021-10-27 ENCOUNTER — OFFICE VISIT (OUTPATIENT)
Dept: CARDIOLOGY | Facility: CLINIC | Age: 51
End: 2021-10-27

## 2021-10-27 VITALS
HEART RATE: 67 BPM | SYSTOLIC BLOOD PRESSURE: 120 MMHG | DIASTOLIC BLOOD PRESSURE: 87 MMHG | WEIGHT: 281.6 LBS | HEIGHT: 68 IN | BODY MASS INDEX: 42.68 KG/M2

## 2021-10-27 DIAGNOSIS — I25.42 CORONARY ARTERY DISSECTION: Primary | ICD-10-CM

## 2021-10-27 DIAGNOSIS — I21.4 NON-STEMI (NON-ST ELEVATED MYOCARDIAL INFARCTION) (HCC): ICD-10-CM

## 2021-10-27 DIAGNOSIS — E66.01 CLASS 3 SEVERE OBESITY DUE TO EXCESS CALORIES WITH SERIOUS COMORBIDITY AND BODY MASS INDEX (BMI) OF 40.0 TO 44.9 IN ADULT (HCC): ICD-10-CM

## 2021-10-27 LAB
ALBUMIN SERPL-MCNC: 3.8 G/DL (ref 3.5–5.2)
ALP SERPL-CCNC: 61 U/L (ref 39–117)
ALT SERPL W P-5'-P-CCNC: 19 U/L (ref 1–33)
AST SERPL-CCNC: 18 U/L (ref 1–32)
BILIRUB CONJ SERPL-MCNC: 0.2 MG/DL (ref 0–0.3)
BILIRUB INDIRECT SERPL-MCNC: 0.5 MG/DL
BILIRUB SERPL-MCNC: 0.7 MG/DL (ref 0–1.2)
CHOLEST SERPL-MCNC: 139 MG/DL (ref 0–200)
HDLC SERPL-MCNC: 40 MG/DL (ref 40–60)
LDLC SERPL CALC-MCNC: 69 MG/DL (ref 0–100)
LDLC/HDLC SERPL: 1.59 {RATIO}
PROT SERPL-MCNC: 6.8 G/DL (ref 6–8.5)
TRIGL SERPL-MCNC: 177 MG/DL (ref 0–150)
VLDLC SERPL-MCNC: 30 MG/DL (ref 5–40)

## 2021-10-27 PROCEDURE — 99214 OFFICE O/P EST MOD 30 MIN: CPT | Performed by: INTERNAL MEDICINE

## 2021-10-27 PROCEDURE — 80076 HEPATIC FUNCTION PANEL: CPT | Performed by: INTERNAL MEDICINE

## 2021-10-27 PROCEDURE — 93000 ELECTROCARDIOGRAM COMPLETE: CPT | Performed by: INTERNAL MEDICINE

## 2021-10-27 PROCEDURE — 80061 LIPID PANEL: CPT | Performed by: INTERNAL MEDICINE

## 2021-10-27 PROCEDURE — 36415 COLL VENOUS BLD VENIPUNCTURE: CPT | Performed by: INTERNAL MEDICINE

## 2021-10-27 NOTE — PROGRESS NOTES
Date of Office Visit: 10/27/21  Encounter Provider: Nicholas Cohn MD  Place of Service: [facility] CARDIOLOGY  Patient Name: Myesha Matthew  :1970  [id number]    Chief Complaint   Patient presents with   • Coronary Artery Disease   :     HPI: Myesha Matthew is a 50 y.o. female  A past cardiac history significant for spontaneous coronary artery dissection of her distal LAD in 2016.  At that time, the rest of her coronaries were normal.  She is doing well she is not having any chest pain shortness of breath PND orthopnea edema syncope palpitations.  She is pretty obese she has been vaccinated and she has had Covid    Past Medical History:   Diagnosis Date   • CAD (coronary artery disease)    • Carpal tunnel syndrome    • GERD (gastroesophageal reflux disease)    • NSTEMI (non-ST elevated myocardial infarction) ([facility])        Past Surgical History:   Procedure Laterality Date   • CARDIAC CATHETERIZATION Left 2016    Procedure: Cardiac catheterization;  Surgeon: Nicholas Cohn MD;  Location: [facility] INVASIVE LOCATION;  Service:        Social History     Socioeconomic History   • Marital status: Single   Tobacco Use   • Smoking status: Former Smoker   • Smokeless tobacco: Never Used   • Tobacco comment: CAFFEINE USE: 2-3 GINGER ALE DAILY   Substance and Sexual Activity   • Alcohol use: Yes     Alcohol/week: 2.0 standard drinks     Types: 2 Shots of liquor per week     Comment: weekends   • Drug use: No   • Sexual activity: Defer       Family History   Problem Relation Age of Onset   • Diabetes Mother    • Hypertension Mother    • Heart failure Mother    • Breast cancer Mother    • Hepatitis Father    • No Known Problems Sister    • Crohn's disease Brother    • Hypertension Maternal Grandmother    • COPD Maternal Grandmother    • No Known Problems Maternal Grandfather    • Heart disease Paternal Grandmother    • Heart attack Paternal Grandmother    • COPD Paternal Grandmother   "  • No Known Problems Paternal Grandfather        Review of Systems   Constitutional: Negative for decreased appetite, fever, malaise/fatigue and weight loss.   HENT: Negative for nosebleeds.    Eyes: Negative for double vision.   Cardiovascular: Negative for chest pain, claudication, cyanosis, dyspnea on exertion, irregular heartbeat, leg swelling, near-syncope, orthopnea, palpitations, paroxysmal nocturnal dyspnea and syncope.   Respiratory: Negative for cough, hemoptysis and shortness of breath.    Hematologic/Lymphatic: Negative for bleeding problem.   Skin: Negative for rash.   Musculoskeletal: Negative for falls and myalgias.   Gastrointestinal: Negative for hematochezia, jaundice, melena, nausea and vomiting.   Genitourinary: Negative for hematuria.   Neurological: Negative for dizziness and seizures.   Psychiatric/Behavioral: Negative for altered mental status and memory loss.       No Known Allergies      Current Outpatient Medications:   •  atorvastatin (LIPITOR) 80 MG tablet, Take 1 tablet by mouth Every Night., Disp: 90 tablet, Rfl: 2  •  clopidogrel (PLAVIX) 75 MG tablet, Take 1 tablet by mouth Daily., Disp: 90 tablet, Rfl: 3  •  cyclobenzaprine (FLEXERIL) 10 MG tablet, Take 10 mg by mouth As Needed., Disp: , Rfl:   •  hydroCHLOROthiazide (MICROZIDE) 12.5 MG capsule, Take 1 capsule by mouth Daily., Disp: , Rfl: 0  •  metoprolol tartrate (LOPRESSOR) 25 MG tablet, Take 0.5 tablets by mouth Every 12 (Twelve) Hours., Disp: 90 tablet, Rfl: 3      Objective:     Vitals:    10/27/21 1142   BP: 120/87   Pulse: 67   Weight: 128 kg (281 lb 9.6 oz)   Height: 172.7 cm (68\")     Body mass index is 42.82 kg/m².    Constitutional:       Appearance: Well-developed.   Eyes:      General: No scleral icterus.  HENT:      Head: Normocephalic.   Neck:      Thyroid: No thyromegaly.      Vascular: No JVD.      Lymphadenopathy: No cervical adenopathy.   Pulmonary:      Effort: Pulmonary effort is normal.      Breath sounds: " Normal breath sounds. No wheezing. No rales.   Cardiovascular:      Normal rate. Regular rhythm.      No gallop.   Edema:     Peripheral edema absent.   Abdominal:      Palpations: Abdomen is soft.      Tenderness: There is no abdominal tenderness.   Musculoskeletal: Normal range of motion. Skin:     General: Skin is warm and dry.      Findings: No rash.   Neurological:      Mental Status: Alert and oriented to person, place, and time.           ECG 12 Lead    Date/Time: 10/27/2021 12:22 PM  Performed by: Nicholas Cohn MD  Authorized by: Nicholas Conh MD   Comparison: compared with previous ECG   Similar to previous ECG  Rhythm: sinus rhythm    Clinical impression: normal ECG             Assessment:       Diagnosis Plan   1. Coronary artery dissection  Hepatic Function Panel    Lipid Panel   2. Non-STEMI (non-ST elevated myocardial infarction) (HCC)  Hepatic Function Panel    Lipid Panel   3. Class 3 severe obesity due to excess calories with serious comorbidity and body mass index (BMI) of 40.0 to 44.9 in adult (HCC)  Hepatic Function Panel    Lipid Panel          Plan:       I think at this point she is doing pretty well I think there is about a 15% chance that she could have a recurrent event from coronary artery dissection.  I like her medical therapy she is tolerating it well we will send her over to get some labs spent most of the time talking with her about weight loss but also spoke with her little bit about the booster for COVID-19.  We will have her come back and see us in a year see me in 2    As always, it has been a pleasure to participate in your patient's care.      Sincerely,       Nicholas Cohn MD

## 2021-10-28 ENCOUNTER — TELEPHONE (OUTPATIENT)
Dept: CARDIOLOGY | Facility: CLINIC | Age: 51
End: 2021-10-28

## 2021-10-28 NOTE — TELEPHONE ENCOUNTER
Called patient and told her lipids ok per       Lipid Panel  Order: [id number]   Status: Final result     Visible to patient: No (inaccessible in MyChart)     Dx: Coronary artery dissection; Non-STEMI...    Specimen Information: Blood         1 Result Note    Component   Ref Range & Units 1 d ago   Total Cholesterol   0 - 200 mg/dL 139    Triglycerides   0 - 150 mg/dL 177 High     HDL Cholesterol   40 - 60 mg/dL 40    LDL Cholesterol    0 - 100 mg/dL 69    VLDL Cholesterol   5 - 40 mg/dL 30    LDL/HDL Ratio  1.59    Resulting Agency  [facility]             Narrative  Performed by:  [facility]  Cholesterol Reference Ranges   (U.S. Department of Health and Human Services ATP III Classifications)     Desirable          <200 mg/dL   Borderline High    200-239 mg/dL   High Risk          >240 mg/dL       Triglyceride Reference Ranges   (U.S. Department of Health and Human Services ATP III Classifications)     Normal           <150 mg/dL   Borderline High  150-199 mg/dL   High             200-499 mg/dL   Very High        >500 mg/dL     HDL Reference Ranges   (U.S. Department of Health and Human Services ATP III Classifcations)     Low     <40 mg/dl (major risk factor for CHD)   High    >60 mg/dl ('negative' risk factor for CHD)         LDL Reference Ranges   (U.S. Department of Health and Human Services ATP III Classifcations)     Optimal          <100 mg/dL   Near Optimal     100-129 mg/dL   Borderline High  130-159 mg/dL   High             160-189 mg/dL   Very High        >189 mg/dL      Specimen Collected: 10/27/21 12:49 Last Resulted: 10/27/21 13:48         Lab Flowsheet      Order Details      View Encounter      Lab and Collection Details      Routing      Result History             Result Care Coordination                 Message  Received: Yesterday  Nicholas Cohn MD Bishop, Rita, MA  Tell her lipids look ok               Associated Results      Hepatic Function Panel  Order: [id number]   Status: Final result      Visible to patient: No (inaccessible in MyChart)     Dx: Coronary artery dissection; Non-STEMI...    Specimen Information: Blood         1 Result Note    Component   Ref Range & Units 1 d ago 4 yr ago 5 yr ago   Total Protein   6.0 - 8.5 g/dL 6.8  7.2  7.5    Albumin   3.50 - 5.20 g/dL 3.80  4.10  4.10    ALT (SGPT)   1 - 33 U/L 19  35 High   15    AST (SGOT)   1 - 32 U/L 18  23  17    Alkaline Phosphatase   39 - 117 U/L 61  49  53    Total Bilirubin   0.0 - 1.2 mg/dL 0.7  0.8 R  0.6 R    Bilirubin, Direct   0.0 - 0.3 mg/dL 0.2      Bilirubin, Indirect   mg/dL 0.5      Resulting Agency  [facility]  [facility]              Specimen Collected: 10/27/21 12:49 Last Resulted: 10/27/21 13:48         Lab Flowsheet      Order Details      View Encounter      Lab and Collection Details      Routing      Result History        R=Reference range differs from displayed r

## 2022-11-02 ENCOUNTER — OFFICE VISIT (OUTPATIENT)
Dept: CARDIOLOGY | Facility: CLINIC | Age: 52
End: 2022-11-02

## 2022-11-02 VITALS
HEIGHT: 68 IN | DIASTOLIC BLOOD PRESSURE: 86 MMHG | SYSTOLIC BLOOD PRESSURE: 126 MMHG | BODY MASS INDEX: 41.37 KG/M2 | WEIGHT: 273 LBS | HEART RATE: 68 BPM

## 2022-11-02 DIAGNOSIS — I25.42 CORONARY ARTERY DISSECTION: Primary | ICD-10-CM

## 2022-11-02 PROCEDURE — 99213 OFFICE O/P EST LOW 20 MIN: CPT | Performed by: NURSE PRACTITIONER

## 2022-11-02 PROCEDURE — 93000 ELECTROCARDIOGRAM COMPLETE: CPT | Performed by: NURSE PRACTITIONER

## 2022-11-02 NOTE — PROGRESS NOTES
Date of Office Visit: 2022  Encounter Provider: NAS Parra  Place of Service: [facility] CARDIOLOGY  Patient Name: Myesha Matthew  :1970    Chief Complaint   Patient presents with   • Coronary artery dissection   :     HPI: Myesha Matthew is a 51 y.o. female.  She is a patient of Dr. 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  At that time, the rest of her coronaries were normal.  We have treated her medically with Plavix.     She was last seen in the office by Dr. Cohn in 2021 at which time she was doing well.  No changes were made to her regimen, and she was advised to follow-up in 1 year.   She has been doing well.  She denies any cardiac complaints.  She denies any chest pain, shortness of breath, palpitations, edema, dizziness, syncope, bleeding difficulties or melena.  Her only complaint is arthritis.    Past Medical History:   Diagnosis Date   • CAD (coronary artery disease)    • Carpal tunnel syndrome    • GERD (gastroesophageal reflux disease)    • NSTEMI (non-ST elevated myocardial infarction) (formerly [facility])        Past Surgical History:   Procedure Laterality Date   • CARDIAC CATHETERIZATION Left 2016    Procedure: Cardiac catheterization;  Surgeon: Nicholas Cohn MD;  Location: [facility] INVASIVE LOCATION;  Service:        Social History     Socioeconomic History   • Marital status: Single   Tobacco Use   • Smoking status: Former   • Smokeless tobacco: Never   • Tobacco comments:     CAFFEINE USE: 2-3 GINGER ALE DAILY   Substance and Sexual Activity   • Alcohol use: Yes     Alcohol/week: 2.0 standard drinks     Types: 2 Shots of liquor per week     Comment: weekends   • Drug use: No   • Sexual activity: Defer       Family History   Problem Relation Age of Onset   • Diabetes Mother    • Hypertension Mother    • Heart failure Mother    • Breast cancer Mother    • Hepatitis Father    • No Known  "Problems Sister    • Crohn's disease Brother    • Hypertension Maternal Grandmother    • COPD Maternal Grandmother    • No Known Problems Maternal Grandfather    • Heart disease Paternal Grandmother    • Heart attack Paternal Grandmother    • COPD Paternal Grandmother    • No Known Problems Paternal Grandfather        Review of Systems   Constitutional: Negative.   Cardiovascular: Negative.  Negative for chest pain, dyspnea on exertion, leg swelling, orthopnea, paroxysmal nocturnal dyspnea and syncope.   Respiratory: Negative.    Hematologic/Lymphatic: Negative for bleeding problem.   Musculoskeletal: Positive for arthritis. Negative for falls.   Gastrointestinal: Negative for melena.   Neurological: Negative for dizziness and light-headedness.       No Known Allergies      Current Outpatient Medications:   •  clopidogrel (PLAVIX) 75 MG tablet, Take 1 tablet by mouth Daily., Disp: 90 tablet, Rfl: 3  •  cyclobenzaprine (FLEXERIL) 10 MG tablet, Take 10 mg by mouth As Needed., Disp: , Rfl:   •  hydroCHLOROthiazide (MICROZIDE) 12.5 MG capsule, Take 1 capsule by mouth Daily., Disp: , Rfl: 0  •  metoprolol tartrate (LOPRESSOR) 25 MG tablet, Take 0.5 tablets by mouth Every 12 (Twelve) Hours., Disp: 90 tablet, Rfl: 3      Objective:     Vitals:    11/02/22 1120   BP: 126/86   Pulse: 68   Weight: 124 kg (273 lb)   Height: 172.7 cm (68\")     Body mass index is 41.51 kg/m².    PHYSICAL EXAM:    Neck:      Vascular: No JVD.   Pulmonary:      Effort: Pulmonary effort is normal.      Breath sounds: Normal breath sounds.   Cardiovascular:      Normal rate. Regular rhythm.      Murmurs: There is no murmur.      No gallop. No click. No rub.   Pulses:     Intact distal pulses.           ECG 12 Lead    Date/Time: 11/2/2022 11:27 AM  Performed by: Selina Mckenna APRN  Authorized by: Selina Mckenna APRN   Comparison: compared with previous ECG from 10/27/2021  Similar to previous ECG  Rhythm: sinus rhythm  Rate: normal  BPM: " 68  T inversion: III                Assessment:       Diagnosis Plan   1. Coronary artery dissection  ECG 12 Lead        Orders Placed This Encounter   Procedures   • ECG 12 Lead     This order was created via procedure documentation     Order Specific Question:   Release to patient     Answer:   Routine Release          Plan:       I think she is doing well.  She suffered a coronary artery dissection of the distal LAD in 2016.  Dr. Cohn feels there is a very small chance of recurrence.  I discussed the atorvastatin with Dr. Cohn.   We feel this is not necessary.  She can stop it and we will repeat her lipids in 6 months.  She will continue clopidogrel.  She will follow up with Dr. Cohn in 1 year.      As always, it has been a pleasure to participate in your patient's care.      Sincerely,         NAS Gallo

## 2023-11-10 ENCOUNTER — OFFICE VISIT (OUTPATIENT)
Dept: CARDIOLOGY | Facility: CLINIC | Age: 53
End: 2023-11-10
Payer: MEDICAID

## 2023-11-10 VITALS
HEART RATE: 73 BPM | DIASTOLIC BLOOD PRESSURE: 78 MMHG | BODY MASS INDEX: 39.86 KG/M2 | HEIGHT: 68 IN | WEIGHT: 263 LBS | SYSTOLIC BLOOD PRESSURE: 130 MMHG

## 2023-11-10 DIAGNOSIS — I21.4 NON-STEMI (NON-ST ELEVATED MYOCARDIAL INFARCTION): ICD-10-CM

## 2023-11-10 DIAGNOSIS — I25.42 CORONARY ARTERY DISSECTION: Primary | ICD-10-CM

## 2023-11-10 PROCEDURE — 93000 ELECTROCARDIOGRAM COMPLETE: CPT | Performed by: INTERNAL MEDICINE

## 2023-11-10 PROCEDURE — 99214 OFFICE O/P EST MOD 30 MIN: CPT | Performed by: INTERNAL MEDICINE

## 2023-11-10 NOTE — PROGRESS NOTES
Date of Office Visit: 11/10/23  Encounter Provider: Nicholas Cohn MD  Place of Service: [facility] CARDIOLOGY  Patient Name: Myesha Matthew  :1970  [id number]    Chief Complaint   Patient presents with    Coronary Artery Disease   :     HPI: Myesha Matthew is a 52 y.o. female  A past cardiac history significant for spontaneous coronary artery dissection of her distal LAD in .  At that time, the rest of her coronaries were normal.  Her LV function was preserved.  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      Past Medical History:   Diagnosis Date    CAD (coronary artery disease)     Carpal tunnel syndrome     GERD (gastroesophageal reflux disease)     NSTEMI (non-ST elevated myocardial infarction)        Past Surgical History:   Procedure Laterality Date    CARDIAC CATHETERIZATION Left 2016    Procedure: Cardiac catheterization;  Surgeon: Nicholas Cohn MD;  Location:  INVASIVE LOCATION;  Service:        Social History     Socioeconomic History    Marital status: Single   Tobacco Use    Smoking status: Former    Smokeless tobacco: Never    Tobacco comments:     CAFFEINE USE: 2-3 GINGER ALE DAILY   Substance and Sexual Activity    Alcohol use: Yes     Alcohol/week: 2.0 standard drinks of alcohol     Types: 2 Shots of liquor per week     Comment: weekends    Drug use: No    Sexual activity: Defer       Family History   Problem Relation Age of Onset    Diabetes Mother     Hypertension Mother     Heart failure Mother     Breast cancer Mother     Hepatitis Father     No Known Problems Sister     Crohn's disease Brother     Hypertension Maternal Grandmother     COPD Maternal Grandmother     No Known Problems Maternal Grandfather      "Heart disease Paternal Grandmother     Heart attack Paternal Grandmother     COPD Paternal Grandmother     No Known Problems Paternal Grandfather        Review of Systems   Constitutional: Negative for decreased appetite, fever, malaise/fatigue and weight loss.   HENT:  Negative for nosebleeds.    Eyes:  Negative for double vision.   Cardiovascular:  Negative for chest pain, claudication, cyanosis, dyspnea on exertion, irregular heartbeat, leg swelling, near-syncope, orthopnea, palpitations, paroxysmal nocturnal dyspnea and syncope.   Respiratory:  Negative for cough, hemoptysis and shortness of breath.    Hematologic/Lymphatic: Negative for bleeding problem.   Skin:  Negative for rash.   Musculoskeletal:  Negative for falls and myalgias.   Gastrointestinal:  Negative for hematochezia, jaundice, melena, nausea and vomiting.   Genitourinary:  Negative for hematuria.   Neurological:  Negative for dizziness and seizures.   Psychiatric/Behavioral:  Negative for altered mental status and memory loss.        No Known Allergies      Current Outpatient Medications:     clopidogrel (PLAVIX) 75 MG tablet, Take 1 tablet by mouth Daily., Disp: 90 tablet, Rfl: 3    cyclobenzaprine (FLEXERIL) 10 MG tablet, Take 1 tablet by mouth As Needed., Disp: , Rfl:     hydroCHLOROthiazide (MICROZIDE) 12.5 MG capsule, Take 1 capsule by mouth Daily., Disp: , Rfl: 0    metoprolol tartrate (LOPRESSOR) 25 MG tablet, Take 0.5 tablets by mouth Every 12 (Twelve) Hours., Disp: 90 tablet, Rfl: 3      Objective:     Vitals:    11/10/23 1149   BP: 130/78   Pulse: 73   Weight: 119 kg (263 lb)   Height: 172.7 cm (68\")     Body mass index is 39.99 kg/m².    Constitutional:       Appearance: Well-developed.   Eyes:      General: No scleral icterus.  HENT:      Head: Normocephalic.   Neck:      Thyroid: No thyromegaly.      Vascular: No JVD.      Lymphadenopathy: No cervical adenopathy.   Pulmonary:      Effort: Pulmonary effort is normal.      Breath " sounds: Normal breath sounds. No wheezing. No rales.   Cardiovascular:      Normal rate. Regular rhythm.      No gallop.    Edema:     Peripheral edema absent.   Abdominal:      Palpations: Abdomen is soft.      Tenderness: There is no abdominal tenderness.   Musculoskeletal: Normal range of motion. Skin:     General: Skin is warm and dry.      Findings: No rash.   Neurological:      Mental Status: Alert and oriented to person, place, and time.           ECG 12 Lead    Date/Time: 11/10/2023 12:33 PM  Performed by: Nicholas Cohn MD    Authorized by: Nicholas Cohn MD  Comparison: compared with previous ECG   Similar to previous ECG  Rhythm: sinus rhythm    Clinical impression: normal ECG           Assessment:       Diagnosis Plan   1. Coronary artery dissection        2. Non-STEMI (non-ST elevated myocardial infarction)               Plan:       I think she is doing well she had a spontaneous coronary artery dissection of her very distal LAD she has not had any symptoms since then and has done well this is not typical coronary disease so does not require statin I do think blood pressure control is important for her she would like to continue on the clopidogrel which I am okay with it a little bit of a plus minus drug in this situation I am not can to make any other changes we will have her come back and see us in a year sooner if she has trouble    As always, it has been a pleasure to participate in your patient's care.      Sincerely,       Nicholas Cohn MD

## 2024-11-18 ENCOUNTER — OFFICE VISIT (OUTPATIENT)
Dept: CARDIOLOGY | Facility: CLINIC | Age: 54
End: 2024-11-18
Payer: COMMERCIAL

## 2024-11-18 VITALS
HEIGHT: 68 IN | SYSTOLIC BLOOD PRESSURE: 138 MMHG | DIASTOLIC BLOOD PRESSURE: 82 MMHG | WEIGHT: 263 LBS | HEART RATE: 96 BPM | BODY MASS INDEX: 39.86 KG/M2

## 2024-11-18 DIAGNOSIS — I25.42 CORONARY ARTERY DISSECTION: Primary | ICD-10-CM

## 2024-11-18 DIAGNOSIS — E78.5 HYPERLIPIDEMIA LDL GOAL <100: ICD-10-CM

## 2024-11-18 PROCEDURE — 93000 ELECTROCARDIOGRAM COMPLETE: CPT | Performed by: NURSE PRACTITIONER

## 2024-11-18 PROCEDURE — 99214 OFFICE O/P EST MOD 30 MIN: CPT | Performed by: NURSE PRACTITIONER

## 2024-11-18 RX ORDER — ATORVASTATIN CALCIUM 10 MG/1
10 TABLET, FILM COATED ORAL DAILY
COMMUNITY
Start: 2024-11-01

## 2024-11-18 RX ORDER — CETIRIZINE HYDROCHLORIDE 10 MG/1
10 TABLET ORAL DAILY
COMMUNITY
Start: 2024-11-13

## 2024-11-18 RX ORDER — FUROSEMIDE 20 MG/1
20 TABLET ORAL DAILY
COMMUNITY
Start: 2024-10-22

## 2024-11-18 RX ORDER — POTASSIUM CHLORIDE 750 MG/1
10 CAPSULE, EXTENDED RELEASE ORAL DAILY
COMMUNITY
Start: 2024-10-23

## 2024-11-18 RX ORDER — GABAPENTIN 100 MG/1
100 CAPSULE ORAL 4 TIMES DAILY
COMMUNITY
Start: 2024-11-15

## 2024-11-18 RX ORDER — HYDROCODONE BITARTRATE AND ACETAMINOPHEN 10; 325 MG/1; MG/1
1 TABLET ORAL EVERY 8 HOURS PRN
COMMUNITY
Start: 2024-11-15

## 2024-11-18 NOTE — PROGRESS NOTES
Date of Office Visit: 2024  Encounter Provider: NAS Parra  Place of Service: [facility] CARDIOLOGY  Patient Name: Myesha Matthew  :1970    Chief Complaint   Patient presents with    Coronary Artery Disease   :     HPI: Myesha Matthew is a 53 y.o. female patient of Dr. 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  At that time, the rest of her coronaries were normal.  We have treated her medically with Plavix.      She was last seen in the office by Dr. Cohn in 2023 at which time she was doing well.  No changes were made to her regimen, and she was advised to follow-up in 1 year.    From a cardiac standpoint, she has been doing well.  She denies any chest pain, shortness of breath, palpitations, edema, dizziness, syncope, bleeding difficulties or melena.  She was recently started on atorvastatin by her primary care doctor.  She has been struggling with diverticulitis and is scheduled for upcoming surgery.  She turns 54 tomorrow.    Past Medical History:   Diagnosis Date    CAD (coronary artery disease)     Carpal tunnel syndrome     GERD (gastroesophageal reflux disease)     NSTEMI (non-ST elevated myocardial infarction)        Past Surgical History:   Procedure Laterality Date    CARDIAC CATHETERIZATION Left 2016    Procedure: Cardiac catheterization;  Surgeon: Nicholas Cohn MD;  Location: [facility] INVASIVE LOCATION;  Service:        Social History     Socioeconomic History    Marital status: Single   Tobacco Use    Smoking status: Former     Passive exposure: Past    Smokeless tobacco: Never    Tobacco comments:     CAFFEINE USE: 2-3 GINGER ALE DAILY   Vaping Use    Vaping status: Never Used   Substance and Sexual Activity    Alcohol use: Yes     Alcohol/week: 2.0 standard drinks of alcohol     Types: 2 Shots of liquor per week     Comment: weekends    Drug use: No    Sexual activity: Defer        Family History   Problem Relation Age of Onset    Diabetes Mother     Hypertension Mother     Heart failure Mother     Breast cancer Mother     Hepatitis Father     No Known Problems Sister     Crohn's disease Brother     Hypertension Maternal Grandmother     COPD Maternal Grandmother     No Known Problems Maternal Grandfather     Heart disease Paternal Grandmother     Heart attack Paternal Grandmother     COPD Paternal Grandmother     No Known Problems Paternal Grandfather        Review of Systems   Constitutional: Negative.   Cardiovascular: Negative.  Negative for chest pain, dyspnea on exertion, leg swelling, orthopnea, paroxysmal nocturnal dyspnea and syncope.   Respiratory: Negative.     Hematologic/Lymphatic: Negative for bleeding problem.   Musculoskeletal:  Negative for falls.   Gastrointestinal:  Negative for melena.   Neurological:  Negative for dizziness and light-headedness.       No Known Allergies      Current Outpatient Medications:     atorvastatin (LIPITOR) 10 MG tablet, Take 1 tablet by mouth Daily., Disp: , Rfl:     cetirizine (zyrTEC) 10 MG tablet, Take 1 tablet by mouth Daily., Disp: , Rfl:     clopidogrel (PLAVIX) 75 MG tablet, Take 1 tablet by mouth Daily., Disp: 90 tablet, Rfl: 3    cyclobenzaprine (FLEXERIL) 10 MG tablet, Take 1 tablet by mouth As Needed., Disp: , Rfl:     Ergocalciferol 10 MCG (400 UNIT) tablet, Take 10 mcg by mouth Daily., Disp: , Rfl:     furosemide (LASIX) 20 MG tablet, Take 1 tablet by mouth Daily., Disp: , Rfl:     gabapentin (NEURONTIN) 100 MG capsule, Take 1 capsule by mouth 4 (Four) Times a Day., Disp: , Rfl:     HYDROcodone-acetaminophen (NORCO)  MG per tablet, Take 1 tablet by mouth Every 8 (Eight) Hours As Needed for Severe Pain., Disp: , Rfl:     metoprolol tartrate (LOPRESSOR) 25 MG tablet, Take 0.5 tablets by mouth Every 12 (Twelve) Hours., Disp: 90 tablet, Rfl: 3    potassium chloride (MICRO-K) 10 MEQ CR capsule, Take 1 capsule by mouth Daily.  "Take mon, weds, fri weekly, Disp: , Rfl:       Objective:     Vitals:    11/18/24 1325   BP: 138/82   Pulse: 96   Weight: 119 kg (263 lb)   Height: 172.7 cm (68\")     Body mass index is 39.99 kg/m².    PHYSICAL EXAM:    Neck:      Vascular: No JVD.   Pulmonary:      Effort: Pulmonary effort is normal.      Breath sounds: Normal breath sounds.   Cardiovascular:      Normal rate. Regular rhythm.      Murmurs: There is no murmur.      No gallop.  No click. No rub.   Pulses:     Intact distal pulses.           ECG 12 Lead    Date/Time: 11/18/2024 1:45 PM  Performed by: Selina Mckenna APRN    Authorized by: Selina Mckenna APRN  Comparison: compared with previous ECG from 11/10/2023  Similar to previous ECG  Rhythm: sinus rhythm  Rate: normal  BPM: 96  T inversion: III and V3  Other findings: non-specific ST-T wave changes            Assessment:       Diagnosis Plan   1. Coronary artery dissection  ECG 12 Lead      2. Hyperlipidemia LDL goal <100          Orders Placed This Encounter   Procedures    ECG 12 Lead     This order was created via procedure documentation     Order Specific Question:   Release to patient     Answer:   Routine Release [[id number]]          Plan:       1.  Coronary artery dissection.  Medically managed with clopidogrel.  At the time of her cath, the remaining coronaries were normal.      2.  Hyperlipidemia.  Continue atorvastatin.      I think she is doing well.  I am not making any changes, and she will follow-up with Dr. Cohn in 1 year.      As always, it has been a pleasure to participate in your patient's care.      Sincerely,         NAS Gallo  "